# Patient Record
Sex: MALE | Race: WHITE
[De-identification: names, ages, dates, MRNs, and addresses within clinical notes are randomized per-mention and may not be internally consistent; named-entity substitution may affect disease eponyms.]

---

## 2017-05-26 ENCOUNTER — PRIOR ORIGINAL RECORDS (OUTPATIENT)
Dept: OTHER | Age: 22
End: 2017-05-26

## 2017-06-01 ENCOUNTER — PRIOR ORIGINAL RECORDS (OUTPATIENT)
Dept: OTHER | Age: 22
End: 2017-06-01

## 2017-06-26 ENCOUNTER — PRIOR ORIGINAL RECORDS (OUTPATIENT)
Dept: OTHER | Age: 22
End: 2017-06-26

## 2017-06-26 ENCOUNTER — HOSPITAL ENCOUNTER (OUTPATIENT)
Facility: HOSPITAL | Age: 22
Setting detail: OBSERVATION
Discharge: HOME | End: 2017-06-27
Attending: EMERGENCY MEDICINE | Admitting: HOSPITALIST
Payer: COMMERCIAL

## 2017-06-26 ENCOUNTER — MYAURORA ACCOUNT LINK (OUTPATIENT)
Dept: OTHER | Age: 22
End: 2017-06-26

## 2017-06-26 DIAGNOSIS — R55 SYNCOPE AND COLLAPSE: Primary | ICD-10-CM

## 2017-06-26 PROCEDURE — 99219 INITIAL OBSERVATION CARE,LEVL II: CPT | Performed by: HOSPITALIST

## 2017-06-26 RX ORDER — ESCITALOPRAM OXALATE 10 MG/1
20 TABLET ORAL DAILY
Status: DISCONTINUED | OUTPATIENT
Start: 2017-06-26 | End: 2017-06-27

## 2017-06-26 RX ORDER — ESCITALOPRAM OXALATE 20 MG/1
20 TABLET ORAL DAILY
COMMUNITY

## 2017-06-26 RX ORDER — MAGNESIUM SULFATE HEPTAHYDRATE 40 MG/ML
2 INJECTION, SOLUTION INTRAVENOUS ONCE
Status: COMPLETED | OUTPATIENT
Start: 2017-06-26 | End: 2017-06-26

## 2017-06-26 RX ORDER — ONDANSETRON 2 MG/ML
4 INJECTION INTRAMUSCULAR; INTRAVENOUS EVERY 6 HOURS PRN
Status: DISCONTINUED | OUTPATIENT
Start: 2017-06-26 | End: 2017-06-27

## 2017-06-26 RX ORDER — BISACODYL 10 MG
10 SUPPOSITORY, RECTAL RECTAL
Status: DISCONTINUED | OUTPATIENT
Start: 2017-06-26 | End: 2017-06-27

## 2017-06-26 RX ORDER — DOCUSATE SODIUM 100 MG/1
100 CAPSULE, LIQUID FILLED ORAL 2 TIMES DAILY
Status: DISCONTINUED | OUTPATIENT
Start: 2017-06-26 | End: 2017-06-27

## 2017-06-26 RX ORDER — ACETAMINOPHEN 325 MG/1
650 TABLET ORAL EVERY 6 HOURS PRN
Status: DISCONTINUED | OUTPATIENT
Start: 2017-06-26 | End: 2017-06-27

## 2017-06-26 RX ORDER — CETIRIZINE HYDROCHLORIDE 10 MG/1
10 TABLET ORAL DAILY
Status: DISCONTINUED | OUTPATIENT
Start: 2017-06-26 | End: 2017-06-27

## 2017-06-26 RX ORDER — SODIUM CHLORIDE 9 MG/ML
INJECTION, SOLUTION INTRAVENOUS CONTINUOUS
Status: DISCONTINUED | OUTPATIENT
Start: 2017-06-26 | End: 2017-06-27

## 2017-06-26 RX ORDER — POLYETHYLENE GLYCOL 3350 17 G/17G
17 POWDER, FOR SOLUTION ORAL DAILY PRN
Status: DISCONTINUED | OUTPATIENT
Start: 2017-06-26 | End: 2017-06-27

## 2017-06-26 RX ORDER — CETIRIZINE HYDROCHLORIDE 10 MG/1
10 TABLET ORAL AS NEEDED
COMMUNITY

## 2017-06-26 RX ORDER — SODIUM PHOSPHATE, DIBASIC AND SODIUM PHOSPHATE, MONOBASIC 7; 19 G/133ML; G/133ML
1 ENEMA RECTAL ONCE AS NEEDED
Status: DISCONTINUED | OUTPATIENT
Start: 2017-06-26 | End: 2017-06-27

## 2017-06-26 NOTE — HISTORICAL OFFICE NOTE
Tatiana Dunne  : 10/23/1995  ACCOUNT:  [id number]  [phone]  PCP:       TODAY'S DATE: 2017  DICTATED BY:  Vanda Schrader M.D.]    CHIEF COMPLAINT: [abnormal test and fatigue.]    HPI:  [On 2017, Gilmar Yee, a 24year old male, well developed, well nourished. WEIGHT: BMI parameters reviewed and discussed. HEAD/FACE: no trauma and normocephalic. EYES: conjunctivae not injected and no xanthelasma. ENT: mucosa pink and moist. NECK: jugular venous pressure not elevated.  RESP: Ybarra Bone

## 2017-06-26 NOTE — ED PROVIDER NOTES
Patient Seen in: [facility]    History   Patient presents with:  Syncope (cardiovascular, neurologic)    Stated Complaint: syncope    HPI    25-year-old male presents for evaluation after syncopal episode.   Patient had a syncopal episode 2 wee 1203]  Temp src: Tympanic [06/26/17 1203]  SpO2: 100 % [06/26/17 1203]  O2 Device: None (Room air) [06/26/17 1302]    Current:/56 (BP Location: Left arm)   Pulse 66   Temp (!) 97.2 °F (36.2 °C) (Tympanic)   Resp 16   Ht 170.2 cm (5' 7\")   Wt 58 kg Normal              Final result                 Please view results for these tests on the individual orders.    URINALYSIS WITH CULTURE REFLEX   CORTISOL   RAINBOW DRAW LAVENDER   RAINBOW DRAW LIGHT GREEN   RAINBOW DRAW BLUE   RAINBOW DRAW GOLD   RAINBOW

## 2017-06-26 NOTE — PLAN OF CARE
PT. ADMITTED FROM ER TO CV AFTER LOC AND SEIZURE IN DR. BURNHAM'S OFFICE TODAY. IVF STARTED, EEG ORDERED.

## 2017-06-26 NOTE — H&P
[facility]    PATIENT'S NAME: Julian Schilder   ATTENDING PHYSICIAN: Sav Quach MD   PATIENT ACCOUNT#:   [id number]    LOCATION:  [place] [address]  MEDICAL RECORD #:   [id number]       YOB: 1995  ADMISSION DATE: minutes and that was the reason he had the 2D echocardiogram done. No other symptoms reported by the patient. PHYSICAL EXAMINATION:    GENERAL:  Alert. Oriented to time, place, and person. No acute distress.    VITAL SIGNS:  Temperature 97.2, pulse

## 2017-06-26 NOTE — ED NOTES
Pt states he was seeing Dr. Vira Galaviz and while the staff was performing orthostatics, pt had a syncopal episode and then started to shake. Pt states he has had a seizure before when he was 6 and the same thing happened.  Pt states all he had to drink was coffee

## 2017-06-27 ENCOUNTER — APPOINTMENT (OUTPATIENT)
Dept: CT IMAGING | Facility: HOSPITAL | Age: 22
End: 2017-06-27
Attending: Other
Payer: COMMERCIAL

## 2017-06-27 VITALS
OXYGEN SATURATION: 97 % | SYSTOLIC BLOOD PRESSURE: 107 MMHG | TEMPERATURE: 98 F | RESPIRATION RATE: 18 BRPM | DIASTOLIC BLOOD PRESSURE: 64 MMHG | WEIGHT: 127.88 LBS | BODY MASS INDEX: 20.07 KG/M2 | HEART RATE: 76 BPM | HEIGHT: 67 IN

## 2017-06-27 PROCEDURE — 95819 EEG AWAKE AND ASLEEP: CPT | Performed by: OTHER

## 2017-06-27 PROCEDURE — 99244 OFF/OP CNSLTJ NEW/EST MOD 40: CPT | Performed by: OTHER

## 2017-06-27 PROCEDURE — 70450 CT HEAD/BRAIN W/O DYE: CPT | Performed by: OTHER

## 2017-06-27 PROCEDURE — 99217 OBSERVATION CARE DISCHARGE: CPT | Performed by: HOSPITALIST

## 2017-06-27 NOTE — DISCHARGE SUMMARY
[facility]    Discharge Summary    Johnjoe Aguilarainer Patient Status:  Observation    10/23/1995 MRN [id number]   Location [address] [facility] Attending No att. providers found   Hosp Day # 0 PCP [address], DO     Date of Admis evaluation. Troponin, CBC, chemistry, and EKG were unremarkable. The patient will be admitted to the hospital for further management and observation.   He was given around 900 mL of normal saline in the office when he was noted to have a blood pressure of

## 2017-06-27 NOTE — CONSULTS
[facility]    Report of Consultation    Dayne Rivera Patient Status:  Observation    10/23/1995 MRN [id number]   Location [facility] Attending Girish Carrasco, DO   Hosp Day # 0 PCP Skyler      Date of Admis he passed out while walking down some steps. He does not recall any precipitant to that event. He did not hit his head. He has had no further problems until last week.     Past Medical History  Past Medical History:   Diagnosis Date   • Depression    • S nocturia  MUSCULOSKELETAL: no joint complaints upper or lower extremities  PSYCHE: Depression  NEURO: As in HPI    Physical Exam:   Blood pressure 107/64, pulse 76, temperature 97.6 °F (36.4 °C), temperature source Oral, resp.  rate 18, height 67\", weight 06/26/2017   CA 9.1 06/26/2017   TSH 1.82 06/26/2017   DDIMER <0.27 06/26/2017   MG 2.4 06/27/2017   TROP 0.00 06/26/2017         Imaging:         Impression:     Syncope and collapse  Patient admitted with an episode of syncope, accompanied by orthostatic

## 2017-06-27 NOTE — PROGRESS NOTES
[facility]    Cardiology Progress Note  [facility]    Pricilla Ogden Patient Status:  Observation    10/23/1995 MRN [id number]   Location [address] [facility] Attending Juanita Burton, [address] Day # 0 PCP E Sinus Rhythm -RSR(V1) -probably normal for age.  PROBABLY NORMAL FOR AGE No previous ECGs available Electronically signed on 06/26/2017 at 13:56 by MD Misha Sheldon MD  6/27/2017

## 2017-06-27 NOTE — PROGRESS NOTES
[facility]   Progress Note    Gilmarmily Yee Patient Status:  Observation    10/23/1995 MRN [id number]   Location [facility]W Attending Edward Ann MD   Hosp Day # 0 PCP [facility],  results  -recheck orthostatics when able  -patient encouraged to drink oral fluids and rise slowly  -patient's friend concerned fatigue is related to low BP but unlikely  Results:     Lab Results  Component Value Date   WBC 7.4 06/26/2017   HGB 15.5 06/26/

## 2017-06-29 NOTE — PROCEDURES
[address]  [facility], [address]      PATIENT'S NAME: Ciro Angelo   ATTENDING PHYSICIAN: Jame Reilly DO   PATIENT ACCOUNT #: [de-identified] LOCATION: [address]W [address] RECORD #: [id number] DATE OF BIRTH: [birth year]

## 2018-08-13 ENCOUNTER — OFFICE VISIT (OUTPATIENT)
Dept: FAMILY MEDICINE CLINIC | Facility: CLINIC | Age: 23
End: 2018-08-13
Payer: COMMERCIAL

## 2018-08-13 VITALS
BODY MASS INDEX: 24.27 KG/M2 | DIASTOLIC BLOOD PRESSURE: 62 MMHG | HEIGHT: 66 IN | HEART RATE: 80 BPM | RESPIRATION RATE: 16 BRPM | SYSTOLIC BLOOD PRESSURE: 94 MMHG | TEMPERATURE: 98 F | WEIGHT: 151 LBS | OXYGEN SATURATION: 98 %

## 2018-08-13 DIAGNOSIS — H61.22 IMPACTED CERUMEN OF LEFT EAR: ICD-10-CM

## 2018-08-13 DIAGNOSIS — H60.502 ACUTE OTITIS EXTERNA OF LEFT EAR, UNSPECIFIED TYPE: Primary | ICD-10-CM

## 2018-08-13 PROCEDURE — 99202 OFFICE O/P NEW SF 15 MIN: CPT | Performed by: NURSE PRACTITIONER

## 2018-08-13 RX ORDER — OMEPRAZOLE 40 MG/1
1 CAPSULE, DELAYED RELEASE ORAL DAILY
Refills: 0 | COMMUNITY
Start: 2018-06-21

## 2018-08-13 RX ORDER — SUCRALFATE 1 G/1
1 TABLET ORAL 4 TIMES DAILY
Refills: 0 | COMMUNITY
Start: 2018-07-11

## 2018-08-13 NOTE — PROGRESS NOTES
CHIEF COMPLAINT:   Patient presents with:  Ear Pain      HPI:   Lilli Blanco is a 20year old male who presents to clinic today with complaints of left ear pain. Has had for 4 days. Pt reports worsening of pain when presses on left tragus.    Home t is intact. Left tragus tender mildlyl on palpation; right tragus non tender on palpation. Left EAC with cerumen impaction. Cerumen removed with curette and irrigations. Left EAC with erythema, no drainage, and mild swelling.   Right external auditory can daily. Patient Instructions     Otitis Externa   · Don't use Q-tips. Keep ear dry. Wear cotton ball in ear during shower. No swimming or head submersion for 7 days and infection cleared. · Use antibiotic drops x 7 days.  You should feel better

## 2018-08-13 NOTE — PATIENT INSTRUCTIONS
Otitis Externa   · Don't use Q-tips. Keep ear dry. Wear cotton ball in ear during shower. No swimming or head submersion for 7 days and infection cleared. · Use antibiotic drops x 7 days. You should feel better in 2 days.  Use drops x 7 days or infection

## 2019-02-28 VITALS
WEIGHT: 128 LBS | HEIGHT: 67 IN | DIASTOLIC BLOOD PRESSURE: 60 MMHG | OXYGEN SATURATION: 97 % | HEART RATE: 78 BPM | SYSTOLIC BLOOD PRESSURE: 85 MMHG | BODY MASS INDEX: 20.09 KG/M2